# Patient Record
Sex: MALE | Race: WHITE
[De-identification: names, ages, dates, MRNs, and addresses within clinical notes are randomized per-mention and may not be internally consistent; named-entity substitution may affect disease eponyms.]

---

## 2021-03-14 ENCOUNTER — HOSPITAL ENCOUNTER (EMERGENCY)
Dept: HOSPITAL 53 - M ED | Age: 41
Discharge: HOME | End: 2021-03-14
Payer: COMMERCIAL

## 2021-03-14 VITALS — DIASTOLIC BLOOD PRESSURE: 77 MMHG | SYSTOLIC BLOOD PRESSURE: 128 MMHG

## 2021-03-14 VITALS — WEIGHT: 197.31 LBS | HEIGHT: 70 IN | BODY MASS INDEX: 28.25 KG/M2
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
## 2021-03-14 DIAGNOSIS — N45.2: Primary | ICD-10-CM
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
## 2021-03-14 DIAGNOSIS — K21.9: ICD-10-CM

## 2021-03-14 DIAGNOSIS — J30.1: ICD-10-CM

## 2021-03-14 DIAGNOSIS — Z98.52: ICD-10-CM

## 2021-03-14 DIAGNOSIS — S30.22XA: ICD-10-CM

## 2021-03-14 NOTE — REP
INDICATION:

trauma to groin



COMPARISON:

None.



TECHNIQUE:

Gray scale and color Doppler evaluation using linear and curved array transducer with

color Doppler evaluation.



FINDINGS:

The testicles and epididymi are relatively normal in contour, size, echogenicity,

vascularity and overall appearance.  Incidental right epididymal head cyst measures 3

mm and left epididymal cyst measures 4 mm.  No evidence for trauma noted.  There is no

evidence for intratesticular mass lesion, infectious/inflammatory process, or torsion.

No obvious hydroceles or varicoceles are identified.



Right testicle measures 5.3 x 1.9 x 3.1 cm.

Left testicle measures 5.0 x 2.1 x 2.8 cm.



IMPRESSION:

Essentially normal scrotal ultrasound.  No evidence for trauma.





<Electronically signed by Jason Alegria > 03/14/21 [id number]